# Patient Record
Sex: FEMALE | URBAN - METROPOLITAN AREA
[De-identification: names, ages, dates, MRNs, and addresses within clinical notes are randomized per-mention and may not be internally consistent; named-entity substitution may affect disease eponyms.]

---

## 2018-01-27 ENCOUNTER — OFFICE VISIT (OUTPATIENT)
Dept: FAMILY MEDICINE CLINIC | Age: 33
End: 2018-01-27

## 2018-01-27 VITALS
DIASTOLIC BLOOD PRESSURE: 72 MMHG | HEART RATE: 77 BPM | WEIGHT: 168 LBS | BODY MASS INDEX: 30.91 KG/M2 | HEIGHT: 62 IN | TEMPERATURE: 98 F | SYSTOLIC BLOOD PRESSURE: 120 MMHG

## 2018-01-27 DIAGNOSIS — H57.13 PAIN OF BOTH EYES: Primary | ICD-10-CM

## 2018-01-27 DIAGNOSIS — I83.93 VARICOSE VEINS OF BOTH LOWER EXTREMITIES: ICD-10-CM

## 2018-01-27 RX ORDER — MULTIVITAMIN/IRON/FOLIC ACID 18MG-0.4MG
TABLET ORAL
Qty: 2 EACH | Refills: 0 | COMMUNITY
Start: 2018-01-27 | End: 2021-10-18

## 2018-01-27 RX ORDER — ASPIRIN 81 MG/1
81 TABLET ORAL DAILY
Qty: 365 TAB | Refills: 1 | COMMUNITY
Start: 2018-01-27 | End: 2021-10-18

## 2018-01-27 NOTE — PROGRESS NOTES
Subjective:     Chief Complaint   Patient presents with    Varicose Veins    Eye Burn        She  is a 27 y.o. female who presents for evaluation of eye pain and varicose veins. Was Dx'd with pytergium 7 yrs ago via optometry visit. In both eyes. Was given glasses and also told she had glaucoma. Notes eye pain and burning. Feels like her S&S are getting worse in the last 1-2 years. Notes bilateral vision changes. No attempted remedies. Is taking a pill once weekly, Rx'd via [facility]. Does not know name. Is not sure why she is taking it. Also c/o of varices bilaterally, works in construction. No attempted remedies. ROS  Gen - no fever/chills  Resp - no dyspnea or cough  CV - no chest pain or GUSMAN  Rest per HPI    Past Medical History:   Diagnosis Date    Pap smear for cervical cancer screening 1/2013    normal     No past surgical history on file. Current Outpatient Prescriptions on File Prior to Visit   Medication Sig Dispense Refill    amitriptyline (ELAVIL) 10 mg tablet Take 1 Tab by mouth nightly. Mi 1 pastilla antes de dormir para dolor. 30 Tab 2     No current facility-administered medications on file prior to visit. Objective:     Vitals:    01/27/18 1140   BP: 120/72   Pulse: 77   Temp: 98 °F (36.7 °C)   TempSrc: Oral   Weight: 168 lb (76.2 kg)   Height: 5' 2.3\" (1.582 m)       Physical Examination:  General appearance - alert, well appearing, and in no distress  Eyes -sclera anicteric  Neck - supple, no significant adenopathy, no thyromegaly  Chest - clear to auscultation, no wheezes, rales or rhonchi, symmetric air entry  Heart - normal rate, regular rhythm, normal S1, S2, no murmurs, rubs, clicks or gallops  Neurological - alert, oriented, no focal findings or movement disorder noted      Noted varices at mid thigh and below, post patellar pulses +2, no edema     Assessment/ Plan:   Diagnoses and all orders for this visit:    1.  Pain of both eyes    2. Varicose veins of both lower extremities        Recommend Pt go for dilated eye exam to r/o glaucoma and/or per AN recs requiring optometry consult. Advised to bring back eye clinic sheet w/ recs on it and if needed, start an opthamology consult via AN. Recommend OTC lubricating drops. Discussed remedies for varicose veins, including compression hose, daily ASA 81mg and reg walking, elevation at night. RTC PRN. I have discussed the diagnosis with the patient and the intended plan as seen in the above orders. The patient has received an after-visit summary and questions were answered concerning future plans. I have discussed medication side effects and warnings with the patient as well. The patient verbalizes understanding and agreement with the plan.     Follow-up Disposition: Not on File

## 2018-01-27 NOTE — PATIENT INSTRUCTIONS
Várices: Instrucciones de cuidado - [ Varicose Veins: Care Instructions ]  Instrucciones de [place] várices son Aundra Bee que ho aumentado de [place] y se ho retorcido cerca de la superficie de la piel. Aparecen con mayor frecuencia en las piernas y los tobillos. Algunas personas podrían ser más propensas que otras a las várices debido al envejecimiento, los cambios hormonales o porque juan de los padres las tienen. El exceso de peso o el embarazo pueden empeorarlas. Los trabajos que requieren permanecer de pie sam mucho tiempo también pueden empeorarlas. La atención de seguimiento es erica parte clave de awad tratamiento y seguridad. Asegúrese de hacer y acudir a todas las citas, y llame a awad médico si está teniendo problemas. También es erica buena idea saber los resultados de los exámenes y mantener erica lista de los medicamentos que teresa. ¿Cómo puede cuidarse en el hogar? · Use medias de compresión sam el día para ayudar a [employer]. Mejoran el flujo de [place] y son el tratamiento principal para las várices. Hable con awad médico sobre qué tipo de medias debe comprar y dónde puede conseguirlas. · Eleve las piernas a la altura o por encima del nivel del corazón cuando sea posible. [place] ayuda a evitar que la goldie se acumule en la parte inferior de las piernas y mejora el flujo de goldie al alejandro del cuerpo. · Evite permanecer de pie o sentado sam mucho tiempo. Troup aumenta la tensión [employer]. · Chris ejercicio de Leni regular y controle awad peso. Asif, monte en bicicleta o nade para mejorar el flujo de [employer] piernas. · Si se golpea la pierna con tanta fuerza que sabe que le va a salir un moretón, elévela y aplíquese hielo o erica compresa fría en la triny por entre 10 y 21 minutos cada vez. Trate de hacerlo cada 1 o 2 horas sam los siguientes 3 días (cuando esté despierto) o hasta que la hinchazón baje. Póngase un paño stout entre el hielo y la piel.   · Si se corta o se raspa la piel sobre erica vena, podría sangrar bastante. Eleve la pierna y aplique presión firme con erica venda limpia sobre el lugar del sangrado. Siga aplicando presión sam 15 minutos. No intente revisar antes de leonel tiempo si el sangrado se ha detenido. Si el sangrado no se ha detenido después de 15 minutos, aplique presión de [employer] otros 15 minutos. Puede repetir esto hasta 3 veces sam un total de 45 minutos. Si tiene un coágulo de goldie en erica várice, podría tener sensibilidad e hinchazón sobre la vena. La vena podría sentirse rígida. Asegúrese de llamar a awad médico de inmediato si tiene estos síntomas. Si awad médico le ha dicho cómo debe cuidar el coágulo, siga allyson instrucciones. El cuidado puede incluir lo siguiente:  · Eleve la pierna y aplique calor con un paño tibio y húmedo o erica almohadilla térmica a baja temperatura (póngase erica toalla o paño entre la pierna y la almohadilla para prevenir quemaduras). · Pregúntele a awad médico si puede lemuel un analgésico (medicamento para el dolor) de venta radha, wayne acetaminofén (Tylenol), ibuprofeno (Advil, Motrin) o naproxeno (Aleve). Sea patti con los medicamentos. Yumiko y siga todas las instrucciones de la [place]. ¿Cuándo debe pedir ayuda? Llame al 911 en cualquier momento que considere que necesita atención de [place]. Por ejemplo, llame si:  ? · Tiene dolor repentino en el pecho y falta de [place], o tose goldie. ?Llame a awad médico ahora mismo o busque atención médica inmediata si:  ? · Tiene señales de un coágulo de [place], wayne:  ¨ Dolor en la pantorrilla, el muslo, la austyn o detrás de la rodilla. ¨ Enrojecimiento e hinchazón en la pierna o la austyn. ? · Jesica Loth a sangrar y no puede detener el sangrado. ? · Tiene un bulto adolorido en la pierna. ? · Tiene erica llaga abierta.    ?Preste especial atención a los cambios en awad placido y asegúrese de comunicarse con awad médico si:  ? · Allyson síntomas de las várices no mejoran con el tratamiento en el hogar. ¿Dónde puede encontrar más información en inglés? Octaviano Page a http://aaron-nelson.info/. Yoly Stubbs [id number] en la búsqueda para aprender Tere [place] de \"Várices: Instrucciones de cuidado - [ Varicose Veins: Care Instructions ]. \"  Revisado: 20 Alka Chaves 2017  Versión del contenido: 11.4  © 8338-1703 Healthwise, Incorporated. Las instrucciones de cuidado fueron adaptadas bajo licencia por [employer] (which disclaims liability or warranty for this information). Si usted tiene [place] afección médica o sobre estas instrucciones, siempre pregunte a awad profesional de placido. Healthwise, Incorporated niega toda garantía o responsabilidad por awad uso de esta información.

## 2018-01-27 NOTE — PROGRESS NOTES
Coordination of Care  1. Have you been to the ER, urgent care clinic since your last visit? Hospitalized since your last visit? No    2. Have you seen or consulted any other health care providers outside of the [address] [place] [address] since your last visit? Include any pap smears or colon screening. No    Medications  Does the patient need refills? YES    Learning Assessment Complete?  yes

## 2018-01-27 NOTE — PROGRESS NOTES
At discharge station AVS was printed and reviewed with pt with Farrah Brooks as . Gave pt vision resource sheet today. She knows that if eye exam and optometrist says she needs a follow up with ophthalmologist that provider will put in a referral for that through our AN program. Pt knows that we would need report. I told her to call the office if this is the case and that she would need to drop off the report and referral order can be done without a provider visit. Discussed with pt AVS varicose veins information.  Laura Carrion RN

## 2018-03-20 ENCOUNTER — ED HISTORICAL/CONVERTED ENCOUNTER (OUTPATIENT)
Dept: OTHER | Age: 33
End: 2018-03-20

## 2019-10-29 ENCOUNTER — HOSPITAL ENCOUNTER (OUTPATIENT)
Dept: LAB | Age: 34
Discharge: HOME | End: 2019-10-29

## 2019-10-29 PROCEDURE — 87624 HPV HI-RISK TYP POOLED RSLT: CPT

## 2019-10-29 PROCEDURE — 88175 CYTOPATH C/V AUTO FLUID REDO: CPT

## 2020-06-19 ENCOUNTER — HOSPITAL ENCOUNTER (OUTPATIENT)
Dept: LAB | Age: 35
Discharge: HOME | End: 2020-06-19

## 2020-06-19 PROCEDURE — 87338 HPYLORI STOOL AG IA: CPT

## 2020-06-22 LAB
H PYLORI AG STL QL IA: POSITIVE
SPECIMEN SOURCE: ABNORMAL

## 2020-10-13 ENCOUNTER — OFFICE VISIT (OUTPATIENT)
Dept: OBGYN CLINIC | Age: 35
End: 2020-10-13

## 2021-01-07 ENCOUNTER — TELEPHONE (OUTPATIENT)
Dept: OBGYN CLINIC | Age: 36
End: 2021-01-07

## 2021-01-07 NOTE — TELEPHONE ENCOUNTER
Call received at 10:43am    PA assistant from [facility] calling to check on the plan of care since the patient is confused.     This nurse advised that the patient was supposed to have come on 10.13.2020 but there are no notes or orders noted

## 2021-02-22 ENCOUNTER — HOSPITAL ENCOUNTER (OUTPATIENT)
Dept: LAB | Age: 36
Discharge: HOME | End: 2021-02-22

## 2021-02-22 PROCEDURE — 87491 CHLMYD TRACH DNA AMP PROBE: CPT

## 2021-02-22 PROCEDURE — 88175 CYTOPATH C/V AUTO FLUID REDO: CPT

## 2021-02-22 PROCEDURE — 87624 HPV HI-RISK TYP POOLED RSLT: CPT

## 2021-02-26 LAB
C TRACH RRNA SPEC QL NAA+PROBE: NEGATIVE
N GONORRHOEA RRNA SPEC QL NAA+PROBE: NEGATIVE
SPECIMEN SOURCE: NORMAL
T VAGINALIS RRNA SPEC QL NAA+PROBE: NEGATIVE

## 2021-03-24 ENCOUNTER — OFFICE VISIT (OUTPATIENT)
Dept: OBGYN CLINIC | Age: 36
End: 2021-03-24
Payer: SUBSIDIZED

## 2021-03-24 VITALS
SYSTOLIC BLOOD PRESSURE: 118 MMHG | WEIGHT: 177 LBS | HEIGHT: 62 IN | DIASTOLIC BLOOD PRESSURE: 64 MMHG | BODY MASS INDEX: 32.57 KG/M2

## 2021-03-24 DIAGNOSIS — R87.610 ASCUS WITH POSITIVE HIGH RISK HPV CERVICAL: Primary | ICD-10-CM

## 2021-03-24 DIAGNOSIS — R87.810 ASCUS WITH POSITIVE HIGH RISK HPV CERVICAL: Primary | ICD-10-CM

## 2021-03-24 PROCEDURE — 57455 BIOPSY OF CERVIX W/SCOPE: CPT | Performed by: OBSTETRICS & GYNECOLOGY

## 2021-03-24 RX ORDER — OXYBUTYNIN CHLORIDE 10 MG/1
TABLET, EXTENDED RELEASE ORAL
COMMUNITY
Start: 2021-02-22 | End: 2021-10-18

## 2021-03-24 NOTE — PROGRESS NOTES
[facility]  OFF[facility] PROCEDURE PROGRESS NOTE        Chart reviewed for the following:   Litzy Roche LPN, have reviewed the History, Physical and updated the Allergic reactions for [place] performed immediately prior to start of procedure:   Litzy Roche LPN, have performed the following reviews on Selene Winn prior to the start of the procedure:            * Patient was identified by name and date of birth   * Agreement on procedure being performed was verified  * Risks and Benefits explained to the patient  * Consent was signed and verified     Time: 205 pm    Date of procedure: 3/24/2021    Procedure performed by: Baron Deborah MD    Provider assisted by: Kenneth Gilman. Maria G JIN    Patient assisted by: self    How tolerated by patient: tolerated the procedure well with no complications    Post Procedural Pain Scale: 2 - Hurts Little Bit    Comments: none    Procedure Note: Colposcopy    Selene Winn is a No obstetric history on file. ,  38 y.o. female DECLINED whose No LMP recorded. was on . She presents for colposcopy for evaluation of a cervical abnormality with a pap smear abnormality consisting of ASCUS and HPV. After being presented with the risks, benefits and alternatives has she signed a consent for the procedure. She states that she understands the need for the procedure and has no further questions. She was informed that she may experience discomfort. Procedure:  She was positioned in the dorsal lithotomy position and a speculum was inserted into the vagina. Dilute acetic acid was applied to the cervix. The colposcope was used to visualize the cervix. Procedure: The transformation zone was completely visualized. Findings: This colposcopy was satisfactory. Biopsies were taken from the cervix, placed in formalin, and sent to pathology. See accompanying diagram for biopsy sites.  Monsels was applied to the cervix. Endocervical currettage: An endocervical curettage was performed. Findings:The procedure was notable for ? Small area of white epithelium on the cervix. Post Procedure Status: The patient tolerated the procedure well with minimal discomfort.

## 2021-03-25 ENCOUNTER — HOSPITAL ENCOUNTER (OUTPATIENT)
Dept: LAB | Age: 36
Discharge: HOME | End: 2021-03-25

## 2021-04-19 ENCOUNTER — TELEPHONE (OUTPATIENT)
Dept: OBGYN CLINIC | Age: 36
End: 2021-04-19

## 2021-04-19 NOTE — TELEPHONE ENCOUNTER
SURGICAL PATHOLOGY: Result Notes   Mariana DukesANNABEL   2/8/[birth year]  2:07 PM EDT      LM for pt to call office via [facility] [place] [place]  #[phone]    Scar De La Cruz MD   3/29/2021  9:41 AM EDT      Her biopsies show MOISES 1. She needs a Pap in one year. Call received at 12:25PM      [phone] assisted with communication      38year old patient last seen in the office on 3/25/2021    Patient advised of MD reviewed labs and recommendations. Patient concerned about cancer returning    Patient is complaining the her period started on 4/5/2021 four days and then stopped and came back on 4/12 and is continuing, changing a pad 2-3 times a day and passing clots size of of a bean and when passing the clots reports the pain is at 8 on the pain scale of 1-10.     Patient has taking motrin for the discomfort but is concerned that she is having the clots and is wondering why she is having the clots    Please advise    Thank you

## 2021-04-19 NOTE — TELEPHONE ENCOUNTER
Marcin [phone]#  Assisted with the communication  Patient was advised of MD recommendations and verbalized understanding.

## 2021-04-19 NOTE — TELEPHONE ENCOUNTER
Her bleeding has nothing to do with her diagnosis of mild cervical dysplasia. Her mild cervical dysplasia will have no symptoms. It will only be picked up on Pap smears and cervical biopsies. If she is having ongoing irregular bleeding then she should make an appt for an endometrial biopsy when she is not bleeding. She had a normal US in October 2020.

## 2021-04-28 NOTE — PROGRESS NOTES
LM via [facility]  to call office for results and recommendations

## 2021-05-03 NOTE — PROGRESS NOTES
Pt informed by triage nurse, Kassandra Felix, on 4/19/21 (see encounter).   Placed in tickle for 1 yr f/u compliance

## 2021-10-14 NOTE — PROGRESS NOTES
[facility]  OFFICE PROCEDURE PROGRESS NOTE        Chart reviewed for the following:   Saundra DUMAS, have reviewed the History, Physical and updated the Allergic reactions for [place] performed immediately prior to start of procedure:   Saundra DUMAS, have performed the following reviews on Tameka Ramirez prior to the start of the procedure:            * Patient was identified by name and date of birth   * Agreement on procedure being performed was verified  * Risks and Benefits explained to the patient  * Consent was signed and verified     Time: 130 pm    Date of procedure: 10/14/2021    Procedure performed by: Peggy Pierre MD    Provider assisted by: Saundra Medina MA    Patient assisted by: self    How tolerated by patient: tolerated the procedure well with no complications    Post Procedural Pain Scale: 2 - Hurts Little Bit    Comments: none      Procedure note: Endometrial biopsy    Tameka Ramirez is a No obstetric history on file. ,  38 y.o. female DECLINED No LMP recorded. The patient has a history of There were no encounter diagnoses. and presents for an endometrial biopsy. Indications:   After the indications, risks, benefits, and alternatives to performing an endometrial biopsy were explained to the patient, her questions were answered and informed consent was obtained. Procedure: The patient was placed on the table in the dorsal lithotomy position. A bimanual exam showed the uterus to be anterior. The uterus was not enlarged. A speculum was placed in the vagina. The cervix was visualized and prepped with zephrin. A tenaculum was not placed on the anterior lip of the cervix for traction. It was not necessary to dilate the cervix. A pipelle was passed through the endocervical canal without difficulty. The uterus was sounded to 8 cm's. A moderate amount of tissue was returned.  This tissue was placed in formalin and sent to pathology. It was felt that an adequate sample was obtained. The patient tolerated the procedure well and she reported mild cramping. The tenaculum and speculum were removed. Post Procedural Status: The patient was observed for 10 minutes after the procedure. She had mild cramping at the time of discharge. There were no complications. The patient was discharged in stable condition. She has irregular bleeding and so comes in for a endometrial biopsie. She is on a pill from [facility] and having irregular bleeding. Has tried an IUD as well and it did not help her bleeding. She had a biopsy today and will try a different pill. She also c/o a vaginal odor and a Nuswab was sent.

## 2021-10-18 ENCOUNTER — OFFICE VISIT (OUTPATIENT)
Dept: OBGYN CLINIC | Age: 36
End: 2021-10-18
Payer: SUBSIDIZED

## 2021-10-18 VITALS — BODY MASS INDEX: 33.65 KG/M2 | WEIGHT: 184 LBS | SYSTOLIC BLOOD PRESSURE: 120 MMHG | DIASTOLIC BLOOD PRESSURE: 74 MMHG

## 2021-10-18 DIAGNOSIS — N89.8 VAGINAL ODOR: ICD-10-CM

## 2021-10-18 DIAGNOSIS — N93.9 ABNORMAL UTERINE BLEEDING (AUB): Primary | ICD-10-CM

## 2021-10-18 LAB
HCG URINE, QL. (POC): NEGATIVE
VALID INTERNAL CONTROL?: YES

## 2021-10-18 PROCEDURE — 58100 BIOPSY OF UTERUS LINING: CPT | Performed by: OBSTETRICS & GYNECOLOGY

## 2021-10-18 PROCEDURE — 81025 URINE PREGNANCY TEST: CPT | Performed by: OBSTETRICS & GYNECOLOGY

## 2021-10-18 PROCEDURE — 99213 OFFICE O/P EST LOW 20 MIN: CPT | Performed by: OBSTETRICS & GYNECOLOGY

## 2021-10-18 RX ORDER — NORGESTIMATE AND ETHINYL ESTRADIOL 0.25-0.035
1 KIT ORAL DAILY
Qty: 3 DOSE PACK | Refills: 1 | Status: SHIPPED | OUTPATIENT
Start: 2021-10-18 | End: 2022-02-21

## 2021-10-18 NOTE — PATIENT INSTRUCTIONS
Sangrado uterino anormal: Instrucciones de cuidado  Abnormal Uterine Bleeding: Care Instructions  Instrucciones de cuidado    El sangrado uterino anormal (AUB, por mariel siglas en inglés) es un sangrado irregular del útero que dura más o es más intenso de lo normal o que no ocurre en el momento habitual para usted. A veces, se debe a cambios en los niveles hormonales. También puede estar causado por crecimientos en el útero, tales wayne fibromas o pólipos. A veces no se puede encontrar la causa. Podría tener mucho sangrado cuando no está esperando awad período. Awad médico puede sugerirle erica prueba de embarazo si cherelle que está Gary Friend. La atención de seguimiento es erica parte clave de awad tratamiento y seguridad. Asegúrese de hacer y acudir a todas las citas, y llame a awad médico si está teniendo problemas. También es erica buena idea saber los resultados de mariel exámenes y mantener erica lista de los medicamentos que teresa. ¿Cómo puede cuidarse en el hogar? · Sea patti con los medicamentos. [place] los analgésicos (medicamentos para el dolor) exactamente wayne le fueron indicados. ? Si el médico le recetó un analgésico, tómelo según las indicaciones. ? Si no está tomando un analgésico recetado, pregúntele a awad médico si puede lemuel juan de [employer]. · Podría faltarle vignesh por la pérdida de goldie. Coma erica dieta equilibrada con alto contenido de vignesh y vitamina C. Entre los alimentos ricos en vignesh se encuentran las bin rendon, los [place], los [place], los frijoles (habichuelas) y las verduras de hojas verdes. Pregúntele a awad médico si necesita lemuel pastillas de vignesh o un multivitamínico.  ¿Cuándo debe pedir ayuda? Llame al 911 en cualquier momento que considere que necesita atención de [place]. Por ejemplo, llame si:    · Se desmayó (perdió el conocimiento).    Llame a awad médico ahora mismo o busque atención médica inmediata si:    · Tiene dolor repentino e intenso en el abdomen o la pelvis.     · Tiene sangrado vaginal intenso. Empapa mariel toallas sanitarias o tampones habituales cada hora sam 2 o más horas.     · Siente mareos o aturdimiento, o que está a punto de desmayarse. Preste especial atención a los cambios en awad placido y asegúrese de comunicarse con awad médico si:    · Tiene un dolor nuevo en el abdomen o la pelvis.     · Tiene fiebre.     · El sangrado empeora o dura más de 1 semana.     · Piensa que podría estar embarazada. ¿Dónde puede encontrar más información en inglés? Vaya a [email or web address]/  Christen [id number] en la búsqueda para aprender más acerca de \"Sangrado uterino anormal: Instrucciones de cuidado. \"  Revisado: 11 febrero, 2021               Versión del contenido: 13.0  © 2799-7567 Healthwise, Incorporated. Las instrucciones de cuidado fueron adaptadas bajo licencia por [employer] (which disclaims liability or warranty for this information). Si usted tiene [place] afección médica o sobre estas instrucciones, siempre pregunte a awad profesional de placido. [place], [place] niega toda garantía o responsabilidad por awad uso de esta información.

## 2021-10-20 LAB
A VAGINAE DNA VAG QL NAA+PROBE: ABNORMAL SCORE
BVAB2 DNA VAG QL NAA+PROBE: ABNORMAL SCORE
C ALBICANS DNA VAG QL NAA+PROBE: NEGATIVE
C GLABRATA DNA VAG QL NAA+PROBE: NEGATIVE
CPT CODES, 490044: NORMAL
CPT DISCLAIMER: NORMAL
CYTOLOGY SPEC DOC CYTO: NORMAL
DIAGNOSIS SYNOPSIS:: NORMAL
DX ICD CODE: NORMAL
DX ICD CODE: NORMAL
MEGA1 DNA VAG QL NAA+PROBE: ABNORMAL SCORE
PATH REPORT.GROSS SPEC: NORMAL
PATH REPORT.RELEVANT HX SPEC: NORMAL
PATHOLOGIST NAME: NORMAL
SPECIMEN SOURCE: NORMAL
SPECIMEN STATUS REPORT, ROLRST: NORMAL

## 2021-10-20 RX ORDER — METRONIDAZOLE 500 MG/1
500 TABLET ORAL 2 TIMES DAILY
Qty: 14 TABLET | Refills: 0 | Status: SHIPPED | OUTPATIENT
Start: 2021-10-20 | End: 2022-02-21 | Stop reason: SDUPTHER

## 2021-10-20 NOTE — PROGRESS NOTES
Her biopsy is normal. Her Nuswab shows BV. I sent a prescription to her pharmacy for Flagyl to treat the BV.

## 2021-10-21 NOTE — PROGRESS NOTES
Per Dr. Yoanna Mena, Called awith  on the line and spoke with patient regarding recent lab results. Patient verbalized understanding and has no questions at this time.

## 2021-10-28 ENCOUNTER — TELEPHONE (OUTPATIENT)
Dept: OBGYN CLINIC | Age: 36
End: 2021-10-28

## 2021-10-28 NOTE — TELEPHONE ENCOUNTER
Call received at 2:00PM      38year old patient last seen in the office on 10/18/2021    Patient calling about her medication Flagyl    Patient was advised that the prescription was sent on 10/20/2021 and patient was advised of the pharmacy MD sent the prescription too. Patient verbalized understanding.

## 2022-02-21 ENCOUNTER — OFFICE VISIT (OUTPATIENT)
Dept: OBGYN CLINIC | Age: 37
End: 2022-02-21
Payer: SUBSIDIZED

## 2022-02-21 VITALS — DIASTOLIC BLOOD PRESSURE: 71 MMHG | SYSTOLIC BLOOD PRESSURE: 113 MMHG | WEIGHT: 181 LBS | BODY MASS INDEX: 33.11 KG/M2

## 2022-02-21 DIAGNOSIS — Z12.4 ENCOUNTER FOR REPEAT PAPANICOLAOU SMEAR OF CERVIX: Primary | ICD-10-CM

## 2022-02-21 DIAGNOSIS — N94.10 DYSPAREUNIA, FEMALE: ICD-10-CM

## 2022-02-21 PROCEDURE — 99213 OFFICE O/P EST LOW 20 MIN: CPT | Performed by: OBSTETRICS & GYNECOLOGY

## 2022-02-21 RX ORDER — METRONIDAZOLE 500 MG/1
500 TABLET ORAL 2 TIMES DAILY
Qty: 14 TABLET | Refills: 2 | Status: SHIPPED | OUTPATIENT
Start: 2022-02-21 | End: 2022-02-28

## 2022-02-21 NOTE — PROGRESS NOTES
Chief Complaint   Abnormal Pap Smear      HPI  Filipe Duong is a 43 y.o. female who presents for the evaluation for repeat pap smear. Patient's last menstrual period was 02/04/2022 (exact date). The patient had a pap smear done on 2/22/21 that showed ASC-US, +HPV. She then had a colposcopy done on 3/24/21 that showed:   * * *FINAL PATHOLOGIC DIAGNOSIS* * *     1.  Endocervix, curettage:        Benign endocervical glandular tissue and squamous epithelium   No dysplasia identified     2.  Ectocervix, biopsy:        Low-grade squamous intraepithelial lesion with HPV effect (MOISES 1)   Background acute cervicitis   No high-grade dysplasia identified     She's here to repeat her pap smear. She also c/o painful intercourse. She has deep dyspareunia. [address] and US in past year is negative/normal. She is on an OCP from [facility] and has regular cycles. Also wants refill of Flagyl. Past Medical History:   Diagnosis Date    Abnormal Papanicolaou smear of cervix     Pap smear for cervical cancer screening 1/2013    normal     No past surgical history on file. Social History     Occupational History    Not on file   Tobacco Use    Smoking status: Never Smoker    Smokeless tobacco: Never Used   Substance and Sexual Activity    Alcohol use: Yes     Alcohol/week: 1.7 standard drinks     Types: 2 Standard drinks or equivalent per week    Drug use: No    Sexual activity: Yes     Partners: Male     No family history on file. No Known Allergies  Prior to Admission medications    Medication Sig Start Date End Date Taking? Authorizing Provider   norgestimate-ethinyl estradioL (Jewel Call) 0.25-35 mg-mcg tab Take 1 Tablet by mouth daily.   Patient not taking: Reported on 2/21/2022 10/18/21   Anam Torre MD        Review of Systems: History obtained from the patient  Constitutional: negative for weight loss, fever, night sweats  HEENT: negative for hearing loss, earache, congestion, snoring, sorethroat  CV: negative for chest pain, palpitations, edema  Resp: negative for cough, shortness of breath, wheezing  Breast: negative for breast lumps, nipple discharge, galactorrhea  GI: negative for change in bowel habits, abdominal pain, black or bloody stools  : negative for frequency, dysuria, hematuria, vaginal discharge  MSK: negative for back pain, joint pain, muscle pain  Skin: negative for itching, rash, hives  Neuro: negative for dizziness, headache, confusion, weakness  Psych: negative for anxiety, depression, change in mood  Heme/lymph: negative for bleeding, bruising, pallor    Objective:  Visit Vitals  /71   Wt 181 lb (82.1 kg)   LMP 02/04/2022 (Exact Date)   BMI 33.11 kg/m²       Physical Exam:   PHYSICAL EXAMINATION    Constitutional  · Appearance: well-nourished, well developed, alert, in no acute distress    HENT  · Head and Face: appears normal    Neck  · Inspection/Palpation: normal appearance, no masses or tenderness  · Lymph Nodes: no lymphadenopathy present  · Thyroid: gland size normal, nontender, no nodules or masses present on palpation    Gastrointestinal  · Abdominal Examination: abdomen non-tender to palpation, normal bowel sounds, no masses present  · Liver and spleen: no hepatomegaly present, spleen not palpable  · Hernias: no hernias identified    Genitourinary  · External Genitalia: normal appearance for age, no discharge present, no tenderness present, no inflammatory lesions present, no masses present, no atrophy present  · Vagina: normal vaginal vault without central or paravaginal defects, no discharge present, no inflammatory lesions present, no masses present  · Bladder: non-tender to palpation  · Urethra: appears normal  · Cervix: normal   · Uterus: normal size, shape and consistency  · Adnexa: no adnexal tenderness present, no adnexal masses present  · Perineum: perineum within normal limits, no evidence of trauma, no rashes or skin lesions present  · Anus: anus within normal limits, no hemorrhoids present  · Inguinal Lymph Nodes: no lymphadenopathy present    Skin  · General Inspection: no rash, no lesions identified    Neurologic/Psychiatric  · Mental Status:  · Orientation: grossly oriented to person, place and time  · Mood and Affect: mood normal, affect appropriate    Assessment:   H/O MOISES 1 for repeat Pap  Deep dyspareunia  Recurrent BV    Plan:   Pap today  Given 10 weeks of samples of [place] 200 mg BID to try. Advised to stop her OCP and use barrier birth control. Refilled Flagyl. RTO prn if symptoms persist or worsen. Instructions given to pt.

## 2022-02-21 NOTE — PATIENT INSTRUCTIONS
Prueba de Papanicolaou anormal: Instrucciones de cuidado  Abnormal Pap Test: Care Instructions  Instrucciones de cuidado    La prueba de Papanicolaou (también llamada citología vaginal) se hace para detectar cambios tempranos que podrían convertirse en cáncer de molly uterino. Awad prueba de Papanicolaou resultó anormal. Eso podría significar que algunas células del molly uterino [place]. Los cambios celulares en awad mayoría son causados por infección del virus del papiloma humano (VPH). Tener un resultado anormal de la prueba de Papanicolaou no significa que las células anómalas le causarán cáncer. [employer] células del molly uterino podrían desaparecer por sí solos o progresar lentamente. Es posible que awad médico [place] repetir la prueba de Papanicolaou o que usted se lusi otras pruebas para lauren si hay cambios celulares. Es muy importante que se luis pruebas de Papanicolaou con regularidad después de yoshi tenido erica prueba de Papanicolaou anormal.  La atención de seguimiento es erica parte clave de awad tratamiento y seguridad. Asegúrese de hacer y acudir a todas las citas, y llame a awad médico si está teniendo problemas. También es erica buena idea saber los resultados de mariel exámenes y mantener erica lista de los medicamentos que teresa. ¿Cómo puede cuidarse en el hogar? · No fume. Fumar podría aumentar el riesgo de cambios en las células del molly uterino. Si necesita ayuda para dejar de fumar, hable con awad médico sobre programas y medicamentos para dejar de fumar. Estos pueden aumentar mariel probabilidades de dejar el hábito para siempre. · Asegúrese de Jovanna Heber pruebas de Papanicolaou u otras pruebas de seguimiento que awad médico le haya indicado. ¿Cuándo debe pedir ayuda? Vigile muy de cerca los cambios en awad placido, y asegúrese de comunicarse con awad médico si tiene algún problema. ¿Dónde puede encontrar más información en inglés?   Mady Adler a [email or web address]  Escriba [id number] en la búsqueda para aprender más acerca de \"Prueba de Papanicolaou anormal: Instrucciones de cuidado. \"  Revisado: 17 diciembre, 2020               Versión del contenido: 13.0  © 3359-0517 [employer], [employer]. Las instrucciones de cuidado fueron adaptadas bajo licencia por [employer] (which disclaims liability or warranty for this information). Si usted tiene [place] afección médica o sobre estas instrucciones, siempre pregunte a awad profesional de placido. [employer], [employer] niega toda garantía o responsabilidad por awad uso de esta información.

## 2022-02-25 ENCOUNTER — TELEPHONE (OUTPATIENT)
Dept: OBGYN CLINIC | Age: 37
End: 2022-02-25

## 2022-02-25 LAB
CYTOLOGIST CVX/VAG CYTO: ABNORMAL
CYTOLOGY CVX/VAG DOC CYTO: ABNORMAL
CYTOLOGY CVX/VAG DOC THIN PREP: ABNORMAL
DX ICD CODE: ABNORMAL
HPV GENOTYPE 18,45: NEGATIVE
HPV I/H RISK 4 DNA CVX QL PROBE+SIG AMP: POSITIVE
HPV16 DNA CVX QL PROBE+SIG AMP: NEGATIVE
Lab: ABNORMAL
OTHER STN SPEC: ABNORMAL
STAT OF ADQ CVX/VAG CYTO-IMP: ABNORMAL

## 2022-02-25 NOTE — TELEPHONE ENCOUNTER
Per Dr. Luisa Darnell and spoke with patient with  on the line regarding recent pap smear results. Per MD, pap was normal, +HPV. He wants to repeat pap in one year instead of 3 years. Patient verbalized understanding and has no questions at this time.

## 2022-02-25 NOTE — PROGRESS NOTES
Per Dr. hO Search and spoke with patient with  on the line regarding recent pap smear results. Patient verbalized understanding and has no questions at this time.

## 2022-05-16 PROCEDURE — 87491 CHLMYD TRACH DNA AMP PROBE: CPT

## 2022-05-17 ENCOUNTER — HOSPITAL ENCOUNTER (OUTPATIENT)
Dept: LAB | Age: 37
Discharge: HOME | End: 2022-05-17

## 2022-05-18 LAB — UR CULT HOLD, URHOLD: NORMAL

## 2022-06-22 ENCOUNTER — HOSPITAL ENCOUNTER (OUTPATIENT)
Dept: LAB | Age: 37
Discharge: HOME | End: 2022-06-22

## 2022-06-22 PROCEDURE — 88175 CYTOPATH C/V AUTO FLUID REDO: CPT

## 2022-06-22 PROCEDURE — 87624 HPV HI-RISK TYP POOLED RSLT: CPT

## 2023-07-19 PROCEDURE — 87798 DETECT AGENT NOS DNA AMP: CPT

## 2023-07-19 PROCEDURE — 87661 TRICHOMONAS VAGINALIS AMPLIF: CPT

## 2023-07-19 PROCEDURE — 87491 CHLMYD TRACH DNA AMP PROBE: CPT

## 2023-07-19 PROCEDURE — 87591 N.GONORRHOEAE DNA AMP PROB: CPT

## 2023-07-19 PROCEDURE — 87801 DETECT AGNT MULT DNA AMPLI: CPT

## 2023-07-20 ENCOUNTER — HOSPITAL ENCOUNTER (OUTPATIENT)
Facility: HOSPITAL | Age: 38
Setting detail: SPECIMEN
Discharge: HOME | End: 2023-07-23

## 2024-09-24 ENCOUNTER — HOSPITAL ENCOUNTER (OUTPATIENT)
Facility: HOSPITAL | Age: 39
Setting detail: SPECIMEN
Discharge: HOME | End: 2024-09-27

## 2024-09-24 PROCEDURE — 83721 ASSAY OF BLOOD LIPOPROTEIN: CPT

## 2024-09-24 PROCEDURE — 80053 COMPREHEN METABOLIC PANEL: CPT

## 2024-09-24 PROCEDURE — 80061 LIPID PANEL: CPT

## 2024-09-24 PROCEDURE — 84443 ASSAY THYROID STIM HORMONE: CPT

## 2024-09-24 PROCEDURE — 85025 COMPLETE CBC W/AUTO DIFF WBC: CPT

## 2024-09-25 LAB
ALBUMIN SERPL-MCNC: 3.8 G/DL (ref 3.5–5)
ALBUMIN/GLOB SERPL: 1 (ref 1.1–2.2)
ALP SERPL-CCNC: 87 U/L (ref 45–117)
ALT SERPL-CCNC: 21 U/L (ref 12–78)
ANION GAP SERPL CALC-SCNC: 7 MMOL/L (ref 2–12)
AST SERPL-CCNC: 18 U/L (ref 15–37)
BASOPHILS # BLD: 0 K/UL (ref 0–0.1)
BASOPHILS NFR BLD: 0 % (ref 0–1)
BILIRUB SERPL-MCNC: 0.3 MG/DL (ref 0.2–1)
BUN SERPL-MCNC: 10 MG/DL (ref 6–20)
BUN/CREAT SERPL: 14 (ref 12–20)
CALCIUM SERPL-MCNC: 9.4 MG/DL (ref 8.5–10.1)
CHLORIDE SERPL-SCNC: 106 MMOL/L (ref 97–108)
CHOLEST SERPL-MCNC: 205 MG/DL
CO2 SERPL-SCNC: 24 MMOL/L (ref 21–32)
CREAT SERPL-MCNC: 0.71 MG/DL (ref 0.55–1.02)
DIFFERENTIAL METHOD BLD: NORMAL
EOSINOPHIL # BLD: 0.1 K/UL (ref 0–0.4)
EOSINOPHIL NFR BLD: 2 % (ref 0–7)
ERYTHROCYTE [DISTWIDTH] IN BLOOD BY AUTOMATED COUNT: 13.7 % (ref 11.5–14.5)
GLOBULIN SER CALC-MCNC: 3.8 G/DL (ref 2–4)
GLUCOSE SERPL-MCNC: 117 MG/DL (ref 65–100)
HCT VFR BLD AUTO: 39.8 % (ref 35–47)
HDLC SERPL-MCNC: 36 MG/DL
HDLC SERPL: 5.7 (ref 0–5)
HGB BLD-MCNC: 12.6 G/DL (ref 11.5–16)
IMM GRANULOCYTES # BLD AUTO: 0 K/UL (ref 0–0.04)
IMM GRANULOCYTES NFR BLD AUTO: 0 % (ref 0–0.5)
LDLC SERPL CALC-MCNC: ABNORMAL MG/DL (ref 0–100)
LDLC SERPL DIRECT ASSAY-MCNC: 88 MG/DL (ref 0–100)
LYMPHOCYTES # BLD: 1.3 K/UL (ref 0.8–3.5)
LYMPHOCYTES NFR BLD: 22 % (ref 12–49)
MCH RBC QN AUTO: 29.4 PG (ref 26–34)
MCHC RBC AUTO-ENTMCNC: 31.7 G/DL (ref 30–36.5)
MCV RBC AUTO: 93 FL (ref 80–99)
MONOCYTES # BLD: 0.5 K/UL (ref 0–1)
MONOCYTES NFR BLD: 8 % (ref 5–13)
NEUTS SEG # BLD: 4.1 K/UL (ref 1.8–8)
NEUTS SEG NFR BLD: 68 % (ref 32–75)
NRBC # BLD: 0 K/UL (ref 0–0.01)
NRBC BLD-RTO: 0 PER 100 WBC
PLATELET # BLD AUTO: 242 K/UL (ref 150–400)
PMV BLD AUTO: 11.8 FL (ref 8.9–12.9)
POTASSIUM SERPL-SCNC: 4 MMOL/L (ref 3.5–5.1)
PROT SERPL-MCNC: 7.6 G/DL (ref 6.4–8.2)
RBC # BLD AUTO: 4.28 M/UL (ref 3.8–5.2)
SODIUM SERPL-SCNC: 137 MMOL/L (ref 136–145)
TRIGL SERPL-MCNC: 610 MG/DL
TSH SERPL DL<=0.05 MIU/L-ACNC: 1.24 UIU/ML (ref 0.36–3.74)
VLDLC SERPL CALC-MCNC: ABNORMAL MG/DL
WBC # BLD AUTO: 6.1 K/UL (ref 3.6–11)